# Patient Record
Sex: MALE | Race: BLACK OR AFRICAN AMERICAN | NOT HISPANIC OR LATINO | Employment: STUDENT | URBAN - METROPOLITAN AREA
[De-identification: names, ages, dates, MRNs, and addresses within clinical notes are randomized per-mention and may not be internally consistent; named-entity substitution may affect disease eponyms.]

---

## 2023-05-08 ENCOUNTER — APPOINTMENT (OUTPATIENT)
Dept: PRIMARY CARE | Facility: CLINIC | Age: 23
End: 2023-05-08
Payer: COMMERCIAL

## 2023-05-16 ENCOUNTER — APPOINTMENT (OUTPATIENT)
Dept: PRIMARY CARE | Facility: CLINIC | Age: 23
End: 2023-05-16
Payer: COMMERCIAL

## 2023-10-05 ENCOUNTER — OFFICE VISIT (OUTPATIENT)
Dept: PRIMARY CARE | Facility: CLINIC | Age: 23
End: 2023-10-05
Payer: COMMERCIAL

## 2023-10-05 VITALS
TEMPERATURE: 97.8 F | HEIGHT: 75 IN | OXYGEN SATURATION: 97 % | HEART RATE: 74 BPM | DIASTOLIC BLOOD PRESSURE: 77 MMHG | WEIGHT: 179 LBS | SYSTOLIC BLOOD PRESSURE: 134 MMHG | BODY MASS INDEX: 22.26 KG/M2

## 2023-10-05 DIAGNOSIS — Z00.00 HEALTHCARE MAINTENANCE: ICD-10-CM

## 2023-10-05 DIAGNOSIS — F90.9 ATTENTION DEFICIT HYPERACTIVITY DISORDER (ADHD), UNSPECIFIED ADHD TYPE: Primary | ICD-10-CM

## 2023-10-05 DIAGNOSIS — F84.5 ASPERGERS' SYNDROME (HHS-HCC): ICD-10-CM

## 2023-10-05 PROCEDURE — 90686 IIV4 VACC NO PRSV 0.5 ML IM: CPT | Performed by: STUDENT IN AN ORGANIZED HEALTH CARE EDUCATION/TRAINING PROGRAM

## 2023-10-05 PROCEDURE — 99214 OFFICE O/P EST MOD 30 MIN: CPT | Performed by: STUDENT IN AN ORGANIZED HEALTH CARE EDUCATION/TRAINING PROGRAM

## 2023-10-05 PROCEDURE — 90471 IMMUNIZATION ADMIN: CPT | Performed by: STUDENT IN AN ORGANIZED HEALTH CARE EDUCATION/TRAINING PROGRAM

## 2023-10-05 PROCEDURE — 1036F TOBACCO NON-USER: CPT | Performed by: STUDENT IN AN ORGANIZED HEALTH CARE EDUCATION/TRAINING PROGRAM

## 2023-10-05 RX ORDER — DEXTROAMPHETAMINE SACCHARATE, AMPHETAMINE ASPARTATE, DEXTROAMPHETAMINE SULFATE AND AMPHETAMINE SULFATE 2.5; 2.5; 2.5; 2.5 MG/1; MG/1; MG/1; MG/1
1 TABLET ORAL DAILY
Qty: 30 TABLET | Refills: 0 | Status: SHIPPED | OUTPATIENT
Start: 2023-10-05

## 2023-10-05 RX ORDER — DEXTROAMPHETAMINE SACCHARATE, AMPHETAMINE ASPARTATE, DEXTROAMPHETAMINE SULFATE AND AMPHETAMINE SULFATE 2.5; 2.5; 2.5; 2.5 MG/1; MG/1; MG/1; MG/1
1 TABLET ORAL DAILY PRN
COMMUNITY
Start: 2022-11-10 | End: 2023-10-05 | Stop reason: SDUPTHER

## 2023-10-05 NOTE — PROGRESS NOTES
"Subjective   Patient ID: Jaden Bazzi is a 23 y.o. male who presents for No chief complaint on file..    HPI   Life/social: HS [employer]. Undergrad [employer]; studying finance. Balanced diet. Regular exercise. No tobacco products. Rare social low risk EtOH. No illicits. Sexually active with female partners. condom use every time      Re: ADHD - dx'd many years ago. CSA needs to be renewed today. Doing well on this medication. Requesting it be sent to a new pharmacy.     Re: HM - due for flu shot. Colon and prostate screening not indicated.      PMHx, FHx, Social Hx, Surg Hx personally reviewed at this appointment. No pertinent findings and/or changes from prior (if applicable).     ROS: Denies wt gain/loss f/c HA LoC CP SOB NVDC. See HPI above, and scanned sheet (if applicable). All other systems are reviewed and are without complaint.     Review of Systems    Objective   /77   Pulse 74   Temp 36.6 °C (97.8 °F)   Ht 1.905 m (6' 3\")   Wt 81.2 kg (179 lb)   SpO2 97%   BMI 22.37 kg/m²     Physical Exam  Gen: well developed in NAD. AAO x3.  HEENT: NC/AT. Anicteric sclera, symmetric pupils. MMM no thrush.  Neck: Soft, supple. No LAD. No goiter.   CV: RRR nl s1s2 no m/r/g  Pulm: CTAB no w/r/r, good air exchange  GI: soft NTND BS+ no hsm  Ext: WWP no edema  Neuro: II-XII grossly intact, nonfocal systemic findings. No tics.   MSK: 5/5 strength b/l UE and LE  Gait: unremarkable       Assessment/Plan   I have considered the risks abuse, dependence, addiction, and diversion and believe it is clinically appropriate for this patient to be on stimulants for his diagnosis of ADHD.OARRS report pulled. Patient compliant.      Given the anticipated/current nature of controlled substance use (stimulant, benzo, etc...) the appropriate controlled substance agreement was signed by myself and the patient, most recently on this date: 10/5/23     # ADHD  - cpmtomie Adderall 10mg; carefully due to tourette's.   - UTox " yearly, must be seen in office q3-6 mos      # Yoko  - clinically monitor now that starting stimulant     # s/p  shunt: remote  - monitor     # Health Maintenance  - routine blood work  - Colon Cancer Screening: Not yet indicated   - PSA: Not yet indicated   - Immunizations: flu shot today  - AAA screening: not indicated

## 2023-10-05 NOTE — PATIENT INSTRUCTIONS
Please stop at the lab ([address]) to complete your blood and/or urine work that I've ordered for you.    I will contact you with the results at my soonest convenience. I strongly urge you to use [facility] as this is the quickest and easiest way to access your results and receive my correspondences.     You received your flu shot today!     I have renewed your chronic medications today     Given the anticipated/current nature of controlled substance use (stimulant, benzo, etc...) the appropriate controlled substance agreement was signed today.

## 2024-08-07 ENCOUNTER — OFFICE VISIT (OUTPATIENT)
Dept: PRIMARY CARE | Facility: CLINIC | Age: 24
End: 2024-08-07
Payer: COMMERCIAL

## 2024-08-07 VITALS
WEIGHT: 188.6 LBS | TEMPERATURE: 97.9 F | HEART RATE: 78 BPM | DIASTOLIC BLOOD PRESSURE: 70 MMHG | SYSTOLIC BLOOD PRESSURE: 135 MMHG | BODY MASS INDEX: 23.57 KG/M2

## 2024-08-07 DIAGNOSIS — F90.9 ATTENTION DEFICIT HYPERACTIVITY DISORDER (ADHD), UNSPECIFIED ADHD TYPE: ICD-10-CM

## 2024-08-07 DIAGNOSIS — Z00.00 HEALTHCARE MAINTENANCE: Primary | ICD-10-CM

## 2024-08-07 DIAGNOSIS — J45.20 MILD INTERMITTENT ASTHMA WITHOUT COMPLICATION (HHS-HCC): ICD-10-CM

## 2024-08-07 DIAGNOSIS — F84.5 ASPERGERS' SYNDROME (HHS-HCC): ICD-10-CM

## 2024-08-07 DIAGNOSIS — Z79.899 CONTROLLED SUBSTANCE AGREEMENT SIGNED: ICD-10-CM

## 2024-08-07 PROCEDURE — 90715 TDAP VACCINE 7 YRS/> IM: CPT | Performed by: STUDENT IN AN ORGANIZED HEALTH CARE EDUCATION/TRAINING PROGRAM

## 2024-08-07 PROCEDURE — 90471 IMMUNIZATION ADMIN: CPT | Performed by: STUDENT IN AN ORGANIZED HEALTH CARE EDUCATION/TRAINING PROGRAM

## 2024-08-07 PROCEDURE — 99213 OFFICE O/P EST LOW 20 MIN: CPT | Performed by: STUDENT IN AN ORGANIZED HEALTH CARE EDUCATION/TRAINING PROGRAM

## 2024-08-07 PROCEDURE — 99395 PREV VISIT EST AGE 18-39: CPT | Performed by: STUDENT IN AN ORGANIZED HEALTH CARE EDUCATION/TRAINING PROGRAM

## 2024-08-07 PROCEDURE — 1036F TOBACCO NON-USER: CPT | Performed by: STUDENT IN AN ORGANIZED HEALTH CARE EDUCATION/TRAINING PROGRAM

## 2024-08-07 RX ORDER — DEXTROAMPHETAMINE SACCHARATE, AMPHETAMINE ASPARTATE, DEXTROAMPHETAMINE SULFATE AND AMPHETAMINE SULFATE 2.5; 2.5; 2.5; 2.5 MG/1; MG/1; MG/1; MG/1
1 TABLET ORAL DAILY
Qty: 30 TABLET | Refills: 0 | Status: SHIPPED | OUTPATIENT
Start: 2024-08-07

## 2024-08-07 RX ORDER — ALBUTEROL SULFATE 90 UG/1
2 INHALANT RESPIRATORY (INHALATION) EVERY 4 HOURS PRN
Qty: 8 G | Refills: 11 | Status: SHIPPED | OUTPATIENT
Start: 2024-08-07 | End: 2025-08-07

## 2024-08-07 NOTE — PATIENT INSTRUCTIONS
Please stop at any  lab ([address] if you choose to use my building) to complete your blood and/or urine work that I've ordered for you.    I will contact you with the results at my soonest convenience. I strongly urge you to use [facility] as this is the quickest and easiest way to access your results and receive my correspondences.     I have renewed your chronic medications today.     We signed a controlled substance agreement today.     You received your tetanus shot today!     See me yearly for a complete physical exam, and sooner as needed for sick visits
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
## 2025-04-07 ASSESSMENT — ENCOUNTER SYMPTOMS
SORE THROAT: 0
RHINORRHEA: 0
VOMITING: 0
COUGH: 0
SHORTNESS OF BREATH: 0
FEVER: 0
NAIL CHANGES: 0
ANOREXIA: 0
DIARRHEA: 0
EYE PAIN: 0
FATIGUE: 0

## 2025-04-09 ENCOUNTER — APPOINTMENT (OUTPATIENT)
Dept: PRIMARY CARE | Facility: CLINIC | Age: 25
End: 2025-04-09
Payer: COMMERCIAL

## 2025-04-09 VITALS
TEMPERATURE: 97 F | SYSTOLIC BLOOD PRESSURE: 140 MMHG | OXYGEN SATURATION: 97 % | HEART RATE: 62 BPM | BODY MASS INDEX: 23.75 KG/M2 | WEIGHT: 191 LBS | DIASTOLIC BLOOD PRESSURE: 66 MMHG | HEIGHT: 75 IN

## 2025-04-09 DIAGNOSIS — J45.20 MILD INTERMITTENT ASTHMA WITHOUT COMPLICATION (HHS-HCC): ICD-10-CM

## 2025-04-09 DIAGNOSIS — F84.5 ASPERGERS' SYNDROME: ICD-10-CM

## 2025-04-09 DIAGNOSIS — L30.9 DERMATITIS: ICD-10-CM

## 2025-04-09 DIAGNOSIS — F90.9 ATTENTION DEFICIT HYPERACTIVITY DISORDER (ADHD), UNSPECIFIED ADHD TYPE: Primary | ICD-10-CM

## 2025-04-09 PROCEDURE — 3008F BODY MASS INDEX DOCD: CPT | Performed by: STUDENT IN AN ORGANIZED HEALTH CARE EDUCATION/TRAINING PROGRAM

## 2025-04-09 PROCEDURE — 1036F TOBACCO NON-USER: CPT | Performed by: STUDENT IN AN ORGANIZED HEALTH CARE EDUCATION/TRAINING PROGRAM

## 2025-04-09 PROCEDURE — 99214 OFFICE O/P EST MOD 30 MIN: CPT | Performed by: STUDENT IN AN ORGANIZED HEALTH CARE EDUCATION/TRAINING PROGRAM

## 2025-04-09 RX ORDER — DEXTROAMPHETAMINE SACCHARATE, AMPHETAMINE ASPARTATE, DEXTROAMPHETAMINE SULFATE AND AMPHETAMINE SULFATE 2.5; 2.5; 2.5; 2.5 MG/1; MG/1; MG/1; MG/1
1 TABLET ORAL DAILY
Qty: 30 TABLET | Refills: 0 | Status: SHIPPED | OUTPATIENT
Start: 2025-04-09

## 2025-04-09 RX ORDER — CLOTRIMAZOLE AND BETAMETHASONE DIPROPIONATE 10; .64 MG/G; MG/G
1 CREAM TOPICAL 2 TIMES DAILY
Qty: 15 G | Refills: 0 | Status: SHIPPED | OUTPATIENT
Start: 2025-04-09 | End: 2025-06-08

## 2025-04-09 ASSESSMENT — PAIN SCALES - GENERAL: PAINLEVEL_OUTOF10: 0-NO PAIN

## 2025-04-09 NOTE — PATIENT INSTRUCTIONS
VISIT SUMMARY:  During today's visit, we addressed your ADHD management, sleep difficulties, and a rash in your groin area. We discussed your current medications, potential side effects, and strategies to improve your sleep and skin condition.    YOUR PLAN:  -ATTENTION DEFICIT HYPERACTIVITY DISORDER (ADHD): ADHD is a condition that affects your ability to focus and control impulses. Your current dose of Adderall 10 mg is effective. We have refilled your prescription and advised you to avoid taking it in the evening to prevent sleep disturbances.    -INSOMNIA: Insomnia is difficulty falling or staying asleep. It may be related to your Adderall use. We recommend taking melatonin 3 mg or less one hour before bed, establishing a bedtime routine with meditation and guided breathing, avoiding electronics and non-sleep activities in bed, and not taking daytime naps.    -CONTACT DERMATITIS: Contact dermatitis is a skin rash caused by contact with a substance. Your rash is likely from an antiperspirant wipe with aluminum chloride. We prescribed Lotrisone cream to apply twice daily and advised moisturizing the area to reduce friction and irritation. We also discussed using appropriate products for sensitive skin areas.    INSTRUCTIONS:  Your Adderall prescription has been sent to [facility]. Please follow the recommendations for improving your sleep and managing your skin condition. If your symptoms persist or worsen, schedule a follow-up appointment.
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
